# Patient Record
(demographics unavailable — no encounter records)

---

## 2025-01-15 NOTE — HISTORY OF PRESENT ILLNESS
[FreeTextEntry1] : 40 p2  had secondary infertility had sharp  pain, saw primary gyn had ovarian cyst complex 7 cm cyst left...right simple 4.1 cm 1  / /...currently menstruating monthly menses x 5 days/moderate dysmenorrhea dyspareunia/ no dyuria no hematochezia

## 2025-01-15 NOTE — PROCEDURE
[FreeTextEntry3] : RIGHT CYST CHARANJIT...RIGHT OV NORMAL LEFT CYST POSTERIOR TO UTERUS, MOBILE, CLEAR FLUID UNILOCULR, NO PAPILLARY EXCRESCENCES, NO MURAL NODULES, NO SOLID COMPONENTS, NO SEPTATIONS. COPIOUS FREE FLUID ....FROM RIGHT CYST...OR IS THE LEFT BEGINNING TO LEAK? UTERUS NORMAL APPEARING [FreeTextEntry4] : NOT AN ENDOMETRIOMA...LOOKS LIKE A FOLLICULAR CYST 1 MTH OF 35 UG PILL

## 2025-01-15 NOTE — PHYSICAL EXAM
[Chaperone Present] : A chaperone was present in the examining room during all aspects of the physical examination [72236] : A chaperone was present during the pelvic exam. [Labia Majora] : normal [Labia Minora] : normal [Normal] : normal [Uterine Adnexae] : normal  [___] : [unfilled] cm mass on the left

## 2025-02-12 NOTE — HISTORY OF PRESENT ILLNESS
[N] : Patient reports normal menses [Oral Contraceptive] : uses oral contraception pills [Y] : Patient is sexually active [No] : Patient does not have concerns regarding sex [FreeTextEntry1] : Graciela is a 41 y/o here for a follow up for an ovarian cyst. She was placed on a 35 ug pill for cyst suppression. She states she was having irregular bleeding while taking the pill. She denies pain associated with the cyst.

## 2025-02-12 NOTE — PHYSICAL EXAM
[Chaperone Present] : A chaperone was present in the examining room during all aspects of the physical examination [95513] : A chaperone was present during the pelvic exam. [Appropriately responsive] : appropriately responsive [Alert] : alert [No Acute Distress] : no acute distress [Soft] : soft [Non-tender] : non-tender [Non-distended] : non-distended [Oriented x3] : oriented x3 [Labia Majora] : normal [Labia Minora] : normal [Normal] : normal [Uterine Adnexae] : normal [FreeTextEntry2] : Lona

## 2025-02-12 NOTE — PROCEDURE
[Suspected Ovarian Cyst] : suspected ovarian cyst [Transvaginal Ultrasound] : transvaginal ultrasound [L: ___ cm] : L: [unfilled] cm [W: ___cm] : W: [unfilled] cm [H: ___ cm] : H: [unfilled] cm [FreeTextEntry5] : 107.99cc vol, EMS 2mm [FreeTextEntry7] : 4.9cm x 4.5cm x 5.5cm, 64.4cc vol unilocular cyst [FreeTextEntry8] : 7.1cm x 5.1cm x 6.8cm, 129.6cc vol cyst with cystic component within  no solid components, no papillary excrescences

## 2025-02-12 NOTE — PHYSICAL EXAM
[Chaperone Present] : A chaperone was present in the examining room during all aspects of the physical examination [[id number]] : A chaperone was present during the pelvic exam. [Appropriately responsive] : appropriately responsive [Alert] : alert [No Acute Distress] : no acute distress [Soft] : soft [Non-tender] : non-tender [Non-distended] : non-distended [Oriented x3] : oriented x3 [Labia Majora] : normal [Labia Minora] : normal [Normal] : normal [Uterine Adnexae] : normal [FreeTextEntry2] : Lona

## 2025-02-12 NOTE — PLAN
[FreeTextEntry1] : The patient's history and presentation were reviewed and discussed with Dr. Stanley. An assessment was conducted in collaboration with Dr. Stanley, and the plan of care was formulated and discussed accordingly.

## 2025-06-17 NOTE — HISTORY OF PRESENT ILLNESS
[Localized] : localized [Menses] : menses [FreeTextEntry1] : HERE FOR FU SONO...KNOWN BILATERAL OVARIAN CYSTS HAD PAIN LAST WEEK POST COITAL AND FEELS AS IF SHE RUPTURED A CYST

## 2025-06-17 NOTE — PROCEDURE
[Transvaginal Ultrasound] : transvaginal ultrasound [FreeTextEntry3] : RIGHT CYST ONE THIRD THE SIZE LEFT CYST ESSENTIALLY UNCHANGED FREE FLUID CERVIX NORMAL [FreeTextEntry5] : 111 CC VOL, 7 MM [FreeTextEntry7] : 23.3 CC VOL, PREV 64 CC [FreeTextEntry8] : 132 CC TOT VOLUME

## 2025-06-17 NOTE — PHYSICAL EXAM
[Chaperoned Physical Exam] : A chaperone was present in the examining room during all aspects of the physical examination. [MA] : MA [Labia Majora] : normal [Labia Minora] : normal [Normal] : normal [Uterine Adnexae] : normal  [Adnexa Tenderness On The Left] : tender [___] : [unfilled] cm mass on the left [FreeTextEntry2] : SHAUN

## 2025-07-16 NOTE — PHYSICAL EXAM
[Chaperoned Physical Exam] : A chaperone was present in the examining room during all aspects of the physical examination. [MA] : MA [FreeTextEntry2] : carolyne

## 2025-07-16 NOTE — HISTORY OF PRESENT ILLNESS
[FreeTextEntry1] : line by line mri discussion tumor marker negative  RIGHT OV NORMAL  LARGE LEFT ENDOMETRIOMA NO CLEAR CUT EVIDENCE OF ENDOMETRIOSIS DUE TO SCAR TISSUE FROM HER SECTION  PATIENT WANTS TO CONCEIVE  HAS NO=T SEEN THE FERTILITY MD YET  SHARED DECION MAKING:  SET UP:  SAUNDRA ROBOTIC EXCISION OF ENDOMETRIOSIS ROBOTIC LEFT ENDOMETRIOMA EXCISION  FISH THE 5 INCISIONS  INFORMED CONSENT AND NAVIGATION OF [employer] Discussed in detail the procedure and the potential complications including but not limited to hemorrhage, infection, and fever.  The risks of potential urinary, gastroenterological and vascular direct complications discussed as well, including but limited to, ureteral repair, bladder repair, bowel repair,  and possible vascular repair   as well.  She understands and is still willing to undergo the procedure.  All questioned answered to her satisfaction.